# Patient Record
Sex: MALE | Race: WHITE | NOT HISPANIC OR LATINO | RURAL
[De-identification: names, ages, dates, MRNs, and addresses within clinical notes are randomized per-mention and may not be internally consistent; named-entity substitution may affect disease eponyms.]

---

## 2023-04-12 ENCOUNTER — CONSULTATION/EVALUATION (OUTPATIENT)
Dept: RURAL CLINIC 1 | Facility: CLINIC | Age: 63
End: 2023-04-12

## 2023-04-12 DIAGNOSIS — H25.813: ICD-10-CM

## 2023-04-12 DIAGNOSIS — H35.371: ICD-10-CM

## 2023-04-12 PROCEDURE — 92014 COMPRE OPH EXAM EST PT 1/>: CPT

## 2023-04-12 PROCEDURE — 92134 CPTRZ OPH DX IMG PST SGM RTA: CPT

## 2023-04-12 ASSESSMENT — VISUAL ACUITY
OD_CC: 20/20
OS_SC: 20/20
OD_SC: 20/25-
OD_PAM: 20/20
OS_AM: 20/20
OS_BAT: 20/40
OD_BAT: 20/40
OS_CC: 20/20

## 2023-04-12 ASSESSMENT — TONOMETRY
OS_IOP_MMHG: 16
OD_IOP_MMHG: 15